# Patient Record
Sex: FEMALE | Race: ASIAN | NOT HISPANIC OR LATINO | URBAN - METROPOLITAN AREA
[De-identification: names, ages, dates, MRNs, and addresses within clinical notes are randomized per-mention and may not be internally consistent; named-entity substitution may affect disease eponyms.]

---

## 2023-04-21 ENCOUNTER — EMERGENCY (EMERGENCY)
Facility: HOSPITAL | Age: 33
LOS: 1 days | Discharge: HOME | End: 2023-04-21
Attending: EMERGENCY MEDICINE
Payer: MEDICAID

## 2023-04-21 VITALS
RESPIRATION RATE: 19 BRPM | SYSTOLIC BLOOD PRESSURE: 115 MMHG | HEART RATE: 184 BPM | WEIGHT: 100.09 LBS | TEMPERATURE: 98 F | DIASTOLIC BLOOD PRESSURE: 75 MMHG | OXYGEN SATURATION: 96 % | HEIGHT: 62 IN

## 2023-04-21 DIAGNOSIS — Z98.891 HISTORY OF UTERINE SCAR FROM PREVIOUS SURGERY: Chronic | ICD-10-CM

## 2023-04-21 LAB
ALBUMIN SERPL ELPH-MCNC: 4.5 G/DL — SIGNIFICANT CHANGE UP (ref 3.3–5)
ALP SERPL-CCNC: 93 U/L — SIGNIFICANT CHANGE UP (ref 40–120)
ALT FLD-CCNC: 38 U/L — SIGNIFICANT CHANGE UP (ref 10–45)
ANION GAP SERPL CALC-SCNC: 15 MMOL/L — SIGNIFICANT CHANGE UP (ref 5–17)
AST SERPL-CCNC: 52 U/L — HIGH (ref 10–40)
BASOPHILS # BLD AUTO: 0.17 K/UL — SIGNIFICANT CHANGE UP (ref 0–0.2)
BASOPHILS NFR BLD AUTO: 1.1 % — SIGNIFICANT CHANGE UP (ref 0–2)
BILIRUB SERPL-MCNC: 0.6 MG/DL — SIGNIFICANT CHANGE UP (ref 0.2–1.2)
BUN SERPL-MCNC: 20 MG/DL — SIGNIFICANT CHANGE UP (ref 7–23)
CALCIUM SERPL-MCNC: 9.2 MG/DL — SIGNIFICANT CHANGE UP (ref 8.4–10.5)
CHLORIDE SERPL-SCNC: 103 MMOL/L — SIGNIFICANT CHANGE UP (ref 96–108)
CO2 SERPL-SCNC: 20 MMOL/L — LOW (ref 22–31)
CREAT SERPL-MCNC: 0.88 MG/DL — SIGNIFICANT CHANGE UP (ref 0.5–1.3)
EGFR: 89 ML/MIN/1.73M2 — SIGNIFICANT CHANGE UP
EOSINOPHIL # BLD AUTO: 0.21 K/UL — SIGNIFICANT CHANGE UP (ref 0–0.5)
EOSINOPHIL NFR BLD AUTO: 1.4 % — SIGNIFICANT CHANGE UP (ref 0–6)
GLUCOSE SERPL-MCNC: 171 MG/DL — HIGH (ref 70–99)
HCG UR QL: NEGATIVE — SIGNIFICANT CHANGE UP
HCT VFR BLD CALC: 41.1 % — SIGNIFICANT CHANGE UP (ref 34.5–45)
HGB BLD-MCNC: 13.1 G/DL — SIGNIFICANT CHANGE UP (ref 11.5–15.5)
IMM GRANULOCYTES NFR BLD AUTO: 0.4 % — SIGNIFICANT CHANGE UP (ref 0–0.9)
LYMPHOCYTES # BLD AUTO: 18.8 % — SIGNIFICANT CHANGE UP (ref 13–44)
LYMPHOCYTES # BLD AUTO: 2.89 K/UL — SIGNIFICANT CHANGE UP (ref 1–3.3)
MAGNESIUM SERPL-MCNC: 2.1 MG/DL — SIGNIFICANT CHANGE UP (ref 1.6–2.6)
MCHC RBC-ENTMCNC: 25 PG — LOW (ref 27–34)
MCHC RBC-ENTMCNC: 31.9 GM/DL — LOW (ref 32–36)
MCV RBC AUTO: 78.6 FL — LOW (ref 80–100)
MONOCYTES # BLD AUTO: 0.71 K/UL — SIGNIFICANT CHANGE UP (ref 0–0.9)
MONOCYTES NFR BLD AUTO: 4.6 % — SIGNIFICANT CHANGE UP (ref 2–14)
NEUTROPHILS # BLD AUTO: 11.31 K/UL — HIGH (ref 1.8–7.4)
NEUTROPHILS NFR BLD AUTO: 73.7 % — SIGNIFICANT CHANGE UP (ref 43–77)
NRBC # BLD: 0 /100 WBCS — SIGNIFICANT CHANGE UP (ref 0–0)
PLATELET # BLD AUTO: 429 K/UL — HIGH (ref 150–400)
POTASSIUM SERPL-MCNC: 3.3 MMOL/L — LOW (ref 3.5–5.3)
POTASSIUM SERPL-SCNC: 3.3 MMOL/L — LOW (ref 3.5–5.3)
PROT SERPL-MCNC: 7.2 G/DL — SIGNIFICANT CHANGE UP (ref 6–8.3)
RBC # BLD: 5.23 M/UL — HIGH (ref 3.8–5.2)
RBC # FLD: 14.3 % — SIGNIFICANT CHANGE UP (ref 10.3–14.5)
SODIUM SERPL-SCNC: 138 MMOL/L — SIGNIFICANT CHANGE UP (ref 135–145)
WBC # BLD: 15.35 K/UL — HIGH (ref 3.8–10.5)
WBC # FLD AUTO: 15.35 K/UL — HIGH (ref 3.8–10.5)

## 2023-04-21 PROCEDURE — 99053 MED SERV 10PM-8AM 24 HR FAC: CPT

## 2023-04-21 PROCEDURE — 99291 CRITICAL CARE FIRST HOUR: CPT

## 2023-04-21 RX ORDER — SODIUM CHLORIDE 9 MG/ML
1000 INJECTION, SOLUTION INTRAVENOUS ONCE
Refills: 0 | Status: COMPLETED | OUTPATIENT
Start: 2023-04-21 | End: 2023-04-21

## 2023-04-21 RX ORDER — POTASSIUM CHLORIDE 20 MEQ
40 PACKET (EA) ORAL ONCE
Refills: 0 | Status: COMPLETED | OUTPATIENT
Start: 2023-04-21 | End: 2023-04-22

## 2023-04-21 RX ADMIN — SODIUM CHLORIDE 4000 MILLILITER(S): 9 INJECTION, SOLUTION INTRAVENOUS at 22:30

## 2023-04-21 RX ADMIN — SODIUM CHLORIDE 2000 MILLILITER(S): 9 INJECTION, SOLUTION INTRAVENOUS at 23:00

## 2023-04-21 NOTE — ED PROVIDER NOTE - CRITICAL CARE ATTENDING CONTRIBUTION TO CARE
------------ATTENDING NOTE------------  pt c/o sudden onset of palpitations, describing feeling hear racing, mild vague chest discomfort, no SOB/dyspnea, no near/syncope, no recent fevers/illness, no recent drugs/intoxicants, in narrow complex tachydysrhythmia 180s, broke to NSR in 90s-100s w/ vagal maneuver, awaiting labs/imaging and close reassessments, CDU for Cards EP consult and TTE and reassessments.  - Kartik Lockett MD   -------------------------------------------------

## 2023-04-21 NOTE — ED CDU PROVIDER DISPOSITION NOTE - CLINICAL COURSE
33-year-old female with no known past medical history, presents to the ED complaining of palpitations.  Patient is an employee and reports she developed palpitations while returning from her break, felt mild lightheadedness and perioral paresthesias.  Patient was directed to the ED, when she presented she was found to be in SVT with a heart rate of 180s.  ED course: Patient responded to vagal maneuvers upon arrival to ED, with conversion to normal sinus rhythm in the 90s/sinus tachycardia in the low 100s.  Upon CDU PA interview, patient reports symptoms resolved and she feels otherwise well.  Patient denies fevers chills, recent illness, URI symptoms, vomiting, diarrhea, caffeine use, thyroid disease, stimulant use, over-the-counter cough/cold medications, family history of cardiovascular disease or dysrhythmia.  Patient states she has had multiple prior episodes of similar palpitations but has never sought medical care for these episodes.  Patient reports some episodes in which she syncopized as after developing palpitations.  Labs in ED significant for WBC 15, K3.3 (given oral repletion), troponin less than 6, magnesium 2.1.  Phosphorus and thyroid studies pending.  CDU course: TTE___. EP____. 33-year-old female with no known past medical history, presents to the ED complaining of palpitations.  Patient is an employee and reports she developed palpitations while returning from her break, felt mild lightheadedness and perioral paresthesias.  Patient was directed to the ED, when she presented she was found to be in SVT with a heart rate of 180s.  ED course: Patient responded to vagal maneuvers upon arrival to ED, with conversion to normal sinus rhythm in the 90s/sinus tachycardia in the low 100s.  Upon CDU PA interview, patient reports symptoms resolved and she feels otherwise well.  Patient denies fevers chills, recent illness, URI symptoms, vomiting, diarrhea, caffeine use, thyroid disease, stimulant use, over-the-counter cough/cold medications, family history of cardiovascular disease or dysrhythmia.  Patient states she has had multiple prior episodes of similar palpitations but has never sought medical care for these episodes.  Patient reports some episodes in which she syncopized as after developing palpitations.  Labs in ED significant for WBC 15, K3.3 (given oral repletion), troponin less than 6, magnesium 2.1.  Phosphorus and thyroid studies pending.  CDU course: pt did well, no events on tele here, echo normal. labs improved this morning. seen by cards and ep- cleared for d/c home, on verapamil, plan for ablation outpatient.

## 2023-04-21 NOTE — ED CDU PROVIDER DISPOSITION NOTE - PATIENT PORTAL LINK FT
You can access the FollowMyHealth Patient Portal offered by [facility] by registering at the following website: [email or web address]. By joining [employer]’s FollowMyHealth portal, you will also be able to view your health information using other applications (apps) compatible with our system.

## 2023-04-21 NOTE — ED CDU PROVIDER DISPOSITION NOTE - ATTENDING APP SHARED VISIT CONTRIBUTION OF CARE
Patient was seen by cardiology and recommended for echo.  Await the results of that.  Patient has been asymptomatic and in sinus rhythm while here.

## 2023-04-21 NOTE — ED CDU PROVIDER INITIAL DAY NOTE - OBJECTIVE STATEMENT
33-year-old female with no known past medical history, presents to the ED complaining of palpitations.  Patient is an employee and reports she developed palpitations while returning from her break, felt mild lightheadedness and perioral paresthesias.  Patient was directed to the ED, when she presented she was found to be in SVT with a heart rate of 180s.  ED course: Patient responded to vagal maneuvers upon arrival to ED, with conversion to normal sinus rhythm in the 90s/sinus tachycardia in the low 100s.  Upon CDU PA interview, patient reports symptoms resolved and she feels otherwise well.  Patient denies fevers chills, recent illness, URI symptoms, vomiting, diarrhea, caffeine use, thyroid disease, stimulant use, over-the-counter cough/cold medications, family history of cardiovascular disease or dysrhythmia. Pt reports rare etoh use. Patient states she has had multiple prior episodes of similar palpitations but has never sought medical care for these episodes.  Patient reports some episodes in which she syncopized as after developing palpitations.  Labs in ED significant for WBC 15, K3.3 (given oral repletion), troponin less than 6, magnesium 2.1.  Phosphorus and thyroid studies pending.

## 2023-04-21 NOTE — ED CDU PROVIDER DISPOSITION NOTE - NSFOLLOWUPINSTRUCTIONS_ED_ALL_ED_FT
1. Follow up with your PCP in 1-2 days. If you do not have a PCP you may follow up with our general internal medicine clinic ([phone]) for continued care.        You may follow-up in the cardiology clinic ([phone]) within 2-3 days.     2. Show copies of your reports given to you.       Take all of your other medications as previously prescribed.     3. Please return to the ED immediately if you develop any worsening or continued chest pain, shortness of breath, palpitations, weakness, nausea/vomiting, lightheadedness, or for any other concerning symptoms. 1. Stay hydrated.  2. Take Verapamil 80mg every 8hrs as needed for palpitations. also can try vagal maneuver if symptoms return.   3. Follow up with your PCP in 1-2 days. Follow up with Cardiologist Dr. Navas within 3-5 days. Follow up with Dr. Almaguer outpatient will set you up with ablation outpatient.    Kendell Navas (DO)  Cardiovascular Disease; Internal Medicine; Nuclear Cardiology  [address], [address]  [place] [address]  Phone: [phone]  Fax: [phone]      Singh Almaguer (MD; PhD)  Cardiac Electrophysiology; Cardiovascular Disease; Internal Medicine  [facility] - Dept of Cardiology, [address] [place] [address]  Phone: [phone]  Fax: [phone]     (Bring printed results to your doctor visit).  4. Return if symptoms, worsen, fever, weakness, chest pain, difficulty breathing, dizziness and all other concerns.    Supraventricular Tachycardia, Adult  Supraventricular tachycardia (SVT) is a type of abnormal heart rhythm. It causes the heart to beat very quickly. SVT can start suddenly and last for a short time, which is called paroxysmal SVT, or it may last longer and require specialized treatment to return the heart rhythm to normal.    A normal resting heart rate is 60–100 beats per minute. During an episode of SVT, your heart rate may be higher than 150 beats per minute. Episodes of SVT can be frightening, but they are usually not dangerous. However, if episodes happen several times a day or last longer than a few seconds, they may lead to heart failure.    What are the causes?    Usually, a normal heartbeat starts when an area called the sinoatrial node releases an electrical signal. In SVT, other areas of the heart send out electrical signals that interfere with the signal from the sinoatrial node. The cause of this abnormal electrical activity is not known.    What increases the risk?  You are more likely to develop this condition if you are:  Middle aged or younger.  Female.  The following factors may also make you more likely to develop this condition:  Stress or anxiety.  Tiredness.  Smoking.  Stimulant drugs, such as cocaine and methamphetamine.  Alcohol.  Caffeine.  Pregnancy.  Having any of these conditions:  A thyroid condition.  Diabetes mellitus.  Obstructive sleep apnea.  What are the signs or symptoms?  Symptoms of this condition include:  A pounding heart.  A feeling that the heart is skipping beats (palpitations).  Weakness.  Shortness of breath.  Tightness or pain in your chest.  Light-headedness or dizziness.  Anxiety.  Sweating.  Nausea.  Fainting.  Fatigue or tiredness.  A mild episode may not cause symptoms.    How is this diagnosed?  This condition may be diagnosed based on:  Your symptoms.  A physical exam. If you have an episode of SVT during the exam, the health care provider may be able to diagnose SVT by listening to your heart and feeling your pulse.  Tests. These may include:  An electrocardiogram (ECG). This test is done to check for problems with electrical activity in the heart.  A Holter monitor or event monitor test. This test involves wearing a portable device that monitors your heart rate over time.  An echocardiogram. This test involves taking an image of your heart using sound waves. It is done to rule out other causes of a fast heart rate.  A stress echocardiogram. This test involves doing an echocardiogram when you are at rest and after exercise.  Blood tests.  An electrophysiology study (EPS). This tests the electrical activity in your heart to find where the abnormal heart rhythm is coming from using cardiac catheters.  How is this treated?  This condition may be treated with:  Vagal nerve stimulation. This involves stimulating your vagus nerve, which is a nerve that runs from the chest, through the neck, to the lower part of the brain. Stimulating this nerve can slow down the heart. It is often the first and only treatment that is needed for this condition. Work with your health care provider to find which technique works best for you. Ways to do this treatment include:  Laying on your back, then holding your breath and pushing, as though you are having a bowel movement.  Massaging an area on one side of your neck, below your jaw. Do not try this yourself. Only a health care provider should do this. If done the wrong way, it can lead to a stroke.  Bending forward with your head between your legs.  Coughing while bending forward with your head between your legs.  Applying an ice-cold, wet towel to your face.  Medicines that prevent attacks.  Medicine to stop an attack. The medicine is given through an IV at the hospital.  A small electric shock (cardioversion) that stops an attack. Before you get the shock, you will get medicine to make you fall asleep.  Radiofrequency ablation. In this procedure, a small, thin tube (catheter) is used to send radiofrequency energy to the area of tissue that is causing the rapid heartbeats. The energy kills the cells and helps your heart keep a normal rhythm. You may have this treatment if you have symptoms of SVT often.  If you do not have symptoms, you may not need treatment.    Follow these instructions at home:  Stress    Avoid stressful situations when possible.  Find healthy ways of managing stress, such as:  Taking part in relaxing activities, such as yoga, meditation, or being out in nature.  Listening to relaxing music.  Practicing relaxation techniques, such as deep breathing.  Leading a healthy lifestyle. This involves getting plenty of sleep, exercising, and eating a balanced diet.  Attending counseling or talk therapy with a mental health professional.  Lifestyle      Try to get at least 7 hours of sleep each night.  Do not use any products that contain nicotine or tobacco. These products include cigarettes, chewing tobacco, and vaping devices, such as e-cigarettes. If you need help quitting, ask your health care provider.  Do not drink alcohol if it triggers episodes of SVT.  If alcohol does not seem to trigger episodes, limit your alcohol intake. If you drink alcohol:  Limit how much you have to:  0–1 drink a day for women who are not pregnant.  0–2 drinks a day for men.  Know how much alcohol is in your drink. In the [place] one drink equals one 12 oz bottle of beer (355 mL), one 5 oz glass of wine (148 mL), or one 1½ oz glass of hard liquor (44 mL).  Be aware of how caffeine affects your condition. If caffeine:  Triggers episodes of SVT, do not eat, drink, or use anything with caffeine in it.  Does not seem to trigger episodes, consume caffeine in moderation.  Do not use stimulant drugs. If you need help quitting, talk with your health care provider.  General instructions    Maintain a healthy weight.  Exercise regularly. Ask your health care provider to suggest some good activities for you. Aim for one or a combination of the followin minutes per week of moderate exercise, such as walking or yoga.  75 minutes per week of vigorous exercise, such as running or swimming.  Perform vagus nerve stimulation as directed by your health care provider.  Take over-the-counter and prescription medicines only as told by your health care provider.  Keep all follow-up visits. This is important.  Contact a health care provider if:  You have episodes of SVT more often than before.  Episodes of SVT last longer than before.  Vagus nerve stimulation is no longer helping.  You have new symptoms.  Get help right away if:  You have chest pain.  Your symptoms get worse.  You have trouble breathing.  You have an episode of SVT that lasts longer than 20 minutes.  You faint.  These symptoms may represent a serious problem that is an emergency. Do not wait to see if the symptoms will go away. Get medical help right away. Call your local emergency services (911 in the [place] Do not drive yourself to the hospital.    Summary  Supraventricular tachycardia (SVT) is a type of abnormal heart rhythm.  During an episode of SVT, your heart rate may be higher than 150 beats per minute.  If you do not have symptoms, you may not need treatment.  This information is not intended to replace advice given to you by your health care provider. Make sure you discuss any questions you have with your health care provider.    Document Revised: 2021 Document Reviewed: 2021

## 2023-04-21 NOTE — ED ADULT NURSE NOTE - OBJECTIVE STATEMENT
Patient is a 33 year old female complaining of palpitations. A&Ox4. Ambulates independently. Patient reports at 2200 palpitations started with SOB - tried to rest but they did not go away. On arrival patient in SVT - vagal maneuvers preformed by MD Lockett, KIESHA broke. Patient now sinus tach, breathing comfortably on room air. Patient denies headache, dizziness, chest pain, cough, abdominal pain, n/v/d, urinary symptoms, fevers, chills, weakness at this time.

## 2023-04-21 NOTE — ED CDU PROVIDER INITIAL DAY NOTE - NS ED ATTENDING STATEMENT MOD
This was a shared visit with the ANYA. I reviewed and verified the documentation and independently performed the documented:

## 2023-04-21 NOTE — ED CDU PROVIDER INITIAL DAY NOTE - PROGRESS NOTE DETAILS
Only recording of SVT in ECG performed at pt arrival, also with 12-lead ECG after conversion to NSR, both placed in pt charts, I spoke to WAR room and no strips available of SVT. d/w EP (cards fellow) agree with plan thusfar and will look into getting patient a more long-term cardiac monitor, will follow while in CDU. -Zack Villavicencio PA-C

## 2023-04-21 NOTE — ED CDU PROVIDER INITIAL DAY NOTE - PHYSICAL EXAMINATION
GEN: Pt in NAD, answer questions appropriate, non-toxic.  PSYCH: Affect appropriate. Normal mentation.  EYES: Sclera white w/o injection.   ENT: Head NCAT. Neck supple FROM.  RESP: CTA b/l, no wheezes, rales, or rhonchi.   CARDIAC: Mildly tachycardic, regular, clear distinct S1, S2, no appreciable murmurs.  ABD: Abdomen soft, non-tender.   VASC: No edema or calf tenderness. Distal extremities warm, cap refill <2 seconds.  SKIN: No notable rash.

## 2023-04-21 NOTE — ED PROVIDER NOTE - PHYSICAL EXAMINATION
anxiousl Appearing;  Symm Facies, PERRL 3mm, (-)Pallor, Anicteric, Dry MM;  No JVD/Bruits or stridor;  tachycardic, regular w/o m/g/r, equal distal pulses;   CTAB w/o distress;   Abd soft, nt/nd, +bs;  No CVAT;  No edema/calf tender;  No rash;  AOX3, Normal speech, normal strength/sensation

## 2023-04-22 VITALS
TEMPERATURE: 98 F | SYSTOLIC BLOOD PRESSURE: 113 MMHG | HEART RATE: 70 BPM | OXYGEN SATURATION: 98 % | DIASTOLIC BLOOD PRESSURE: 68 MMHG

## 2023-04-22 LAB
ANION GAP SERPL CALC-SCNC: 10 MMOL/L — SIGNIFICANT CHANGE UP (ref 5–17)
BASE EXCESS BLDV CALC-SCNC: -1.5 MMOL/L — SIGNIFICANT CHANGE UP (ref -2–3)
BUN SERPL-MCNC: 15 MG/DL — SIGNIFICANT CHANGE UP (ref 7–23)
CA-I SERPL-SCNC: 1.22 MMOL/L — SIGNIFICANT CHANGE UP (ref 1.15–1.33)
CALCIUM SERPL-MCNC: 8.5 MG/DL — SIGNIFICANT CHANGE UP (ref 8.4–10.5)
CHLORIDE BLDV-SCNC: 107 MMOL/L — SIGNIFICANT CHANGE UP (ref 96–108)
CHLORIDE SERPL-SCNC: 108 MMOL/L — SIGNIFICANT CHANGE UP (ref 96–108)
CO2 BLDV-SCNC: 25 MMOL/L — SIGNIFICANT CHANGE UP (ref 22–26)
CO2 SERPL-SCNC: 22 MMOL/L — SIGNIFICANT CHANGE UP (ref 22–31)
CREAT SERPL-MCNC: 0.63 MG/DL — SIGNIFICANT CHANGE UP (ref 0.5–1.3)
EGFR: 120 ML/MIN/1.73M2 — SIGNIFICANT CHANGE UP
GAS PNL BLDV: 138 MMOL/L — SIGNIFICANT CHANGE UP (ref 136–145)
GAS PNL BLDV: SIGNIFICANT CHANGE UP
GLUCOSE BLDV-MCNC: 90 MG/DL — SIGNIFICANT CHANGE UP (ref 70–99)
GLUCOSE SERPL-MCNC: 92 MG/DL — SIGNIFICANT CHANGE UP (ref 70–99)
HCO3 BLDV-SCNC: 24 MMOL/L — SIGNIFICANT CHANGE UP (ref 22–29)
HCT VFR BLDA CALC: 36 % — SIGNIFICANT CHANGE UP (ref 34.5–46.5)
HGB BLD CALC-MCNC: 11.9 G/DL — SIGNIFICANT CHANGE UP (ref 11.7–16.1)
LACTATE BLDV-MCNC: 0.8 MMOL/L — SIGNIFICANT CHANGE UP (ref 0.5–2)
MAGNESIUM SERPL-MCNC: 2.2 MG/DL — SIGNIFICANT CHANGE UP (ref 1.6–2.6)
PCO2 BLDV: 41 MMHG — SIGNIFICANT CHANGE UP (ref 39–42)
PH BLDV: 7.37 — SIGNIFICANT CHANGE UP (ref 7.32–7.43)
PHOSPHATE SERPL-MCNC: 4 MG/DL — SIGNIFICANT CHANGE UP (ref 2.5–4.5)
PO2 BLDV: 80 MMHG — HIGH (ref 25–45)
POTASSIUM BLDV-SCNC: 4 MMOL/L — SIGNIFICANT CHANGE UP (ref 3.5–5.1)
POTASSIUM SERPL-MCNC: 3.9 MMOL/L — SIGNIFICANT CHANGE UP (ref 3.5–5.3)
POTASSIUM SERPL-SCNC: 3.9 MMOL/L — SIGNIFICANT CHANGE UP (ref 3.5–5.3)
SAO2 % BLDV: 97.2 % — HIGH (ref 67–88)
SODIUM SERPL-SCNC: 140 MMOL/L — SIGNIFICANT CHANGE UP (ref 135–145)
T3 SERPL-MCNC: 91 NG/DL — SIGNIFICANT CHANGE UP (ref 80–200)
T4 AB SER-ACNC: 7.2 UG/DL — SIGNIFICANT CHANGE UP (ref 4.6–12)
TSH SERPL-MCNC: 0.5 UIU/ML — SIGNIFICANT CHANGE UP (ref 0.27–4.2)

## 2023-04-22 PROCEDURE — 93005 ELECTROCARDIOGRAM TRACING: CPT

## 2023-04-22 PROCEDURE — 80053 COMPREHEN METABOLIC PANEL: CPT

## 2023-04-22 PROCEDURE — 85018 HEMOGLOBIN: CPT

## 2023-04-22 PROCEDURE — 84443 ASSAY THYROID STIM HORMONE: CPT

## 2023-04-22 PROCEDURE — 82803 BLOOD GASES ANY COMBINATION: CPT

## 2023-04-22 PROCEDURE — G0378: CPT

## 2023-04-22 PROCEDURE — 82435 ASSAY OF BLOOD CHLORIDE: CPT

## 2023-04-22 PROCEDURE — 80048 BASIC METABOLIC PNL TOTAL CA: CPT

## 2023-04-22 PROCEDURE — 85014 HEMATOCRIT: CPT

## 2023-04-22 PROCEDURE — 84295 ASSAY OF SERUM SODIUM: CPT

## 2023-04-22 PROCEDURE — 84484 ASSAY OF TROPONIN QUANT: CPT

## 2023-04-22 PROCEDURE — 93306 TTE W/DOPPLER COMPLETE: CPT

## 2023-04-22 PROCEDURE — 99283 EMERGENCY DEPT VISIT LOW MDM: CPT | Mod: 25

## 2023-04-22 PROCEDURE — 84132 ASSAY OF SERUM POTASSIUM: CPT

## 2023-04-22 PROCEDURE — 76376 3D RENDER W/INTRP POSTPROCES: CPT | Mod: 26

## 2023-04-22 PROCEDURE — 83605 ASSAY OF LACTIC ACID: CPT

## 2023-04-22 PROCEDURE — 99245 OFF/OP CONSLTJ NEW/EST HI 55: CPT

## 2023-04-22 PROCEDURE — 83735 ASSAY OF MAGNESIUM: CPT

## 2023-04-22 PROCEDURE — 82330 ASSAY OF CALCIUM: CPT

## 2023-04-22 PROCEDURE — 99205 OFFICE O/P NEW HI 60 MIN: CPT

## 2023-04-22 PROCEDURE — 93306 TTE W/DOPPLER COMPLETE: CPT | Mod: 26

## 2023-04-22 PROCEDURE — 93356 MYOCRD STRAIN IMG SPCKL TRCK: CPT

## 2023-04-22 PROCEDURE — 85025 COMPLETE CBC W/AUTO DIFF WBC: CPT

## 2023-04-22 PROCEDURE — 81025 URINE PREGNANCY TEST: CPT

## 2023-04-22 PROCEDURE — 99284 EMERGENCY DEPT VISIT MOD MDM: CPT

## 2023-04-22 PROCEDURE — 36415 COLL VENOUS BLD VENIPUNCTURE: CPT

## 2023-04-22 PROCEDURE — 76376 3D RENDER W/INTRP POSTPROCES: CPT

## 2023-04-22 PROCEDURE — 84436 ASSAY OF TOTAL THYROXINE: CPT

## 2023-04-22 PROCEDURE — 84100 ASSAY OF PHOSPHORUS: CPT

## 2023-04-22 PROCEDURE — 84480 ASSAY TRIIODOTHYRONINE (T3): CPT

## 2023-04-22 PROCEDURE — 82947 ASSAY GLUCOSE BLOOD QUANT: CPT

## 2023-04-22 PROCEDURE — 99238 HOSP IP/OBS DSCHRG MGMT 30/<: CPT

## 2023-04-22 RX ORDER — VERAPAMIL HCL 240 MG
1 CAPSULE, EXTENDED RELEASE PELLETS 24 HR ORAL
Qty: 21 | Refills: 0
Start: 2023-04-22 | End: 2023-04-28

## 2023-04-22 RX ADMIN — Medication 40 MILLIEQUIVALENT(S): at 01:40

## 2023-04-22 NOTE — CONSULT NOTE ADULT - SUBJECTIVE AND OBJECTIVE BOX
DATE OF SERVICE: 23 @ 13:21    CHIEF COMPLAINT:Patient is a 33y old  Female who presents with a chief complaint of chest palpitations.     HISTORY OF PRESENT ILLNESS:HPI: Patient is a 33y old female presenting w/ complaint of chest palpitations. Denies PMH.   Presented to the ED while experiencing palpitations that lasted for approximately 5 minutes associated with mild chest discomfort.  On arrival to the ED found to be in narrow complex tachydysrhythmia 180s, broke to NSR in 90s-100s w/ vagal maneuver.       PAST MEDICAL & SURGICAL HISTORY:  No pertinent past medical history      S/P               MEDICATIONS:                  FAMILY HISTORY:  No pertinent family history in first degree relatives        Non-contributory    SOCIAL HISTORY:    [X] Tobacco  [X] Drugs  [ ] Alcohol- social consumption, average 1 drink a week    Allergies    azithromycin (Hives; Rash)    Intolerances    	    REVIEW OF SYSTEMS:  CONSTITUTIONAL: No fever  EYES: No eye pain, visual disturbances, or discharge  ENMT:  No difficulty hearing, tinnitus  NECK: No pain or stiffness  RESPIRATORY: No cough, wheezing,  CARDIOVASCULAR: No chest pain, palpitations, passing out, dizziness, or leg swelling  GASTROINTESTINAL:  No nausea, vomiting, diarrhea or constipation. No melena.  GENITOURINARY: No dysuria, hematuria  NEUROLOGICAL: No stroke like symptoms  SKIN: No burning or lesions   ENDOCRINE: No heat or cold intolerance  MUSCULOSKELETAL: No joint pain or swelling  PSYCHIATRIC: No  anxiety, mood swings  HEME/LYMPH: No bleeding gums  ALLERGY AND IMMUNOLOGIC: No hives or eczema	    All other ROS negative    PHYSICAL EXAM:  T(C): 36.7 (23 @ 11:52), Max: 36.8 (23 @ 22:17)  HR: 88 (23 @ 11:52) (71 - 184)  BP: 125/93 (23 @ 11:52) (100/66 - 128/86)  RR: 18 (23 @ 11:52) (17 - 19)  SpO2: 100% (23 @ 11:52) (96% - 100%)  Wt(kg): --  I&O's Summary      Appearance: Normal	  HEENT:   Normal oral mucosa, EOMI	  Cardiovascular:  S1 S2, No JVD,    Respiratory: Lungs clear to auscultation	  Psychiatry: Alert  Gastrointestinal:  Soft, Non-tender, + BS	  Skin: No rashes   Neurologic: Non-focal  Extremities:  No edema  Vascular: Peripheral pulses palpable    	    	  	  CARDIAC MARKERS:  Labs personally reviewed by me                                  13.1   15.35 )-----------( 429      ( 2023 22:32 )             41.1         140  |  108  |  15  ----------------------------<  92  3.9   |  22  |  0.63    Ca    8.5      2023 05:59  Phos  4.0       Mg     2.2         TPro  7.2  /  Alb  4.5  /  TBili  0.6  /  DBili  x   /  AST  52<H>  /  ALT  38  /  AlkPhos  93            EKG: Personally reviewed by me - NSR, T wave abnormality noted in V1 and V2      Assessment /Plan:   Patient is a 33y old female presenting w/ complaint of chest palpitations. Denies PMH.   Presented to the ED while experiencing palpitations that lasted for approximately 5 minutes associated with mild chest discomfort.  On arrival to the ED found to be in narrow complex tachydysrhythmia 180s, broke to NSR in 90s-100s w/ vagal maneuver. Patient reports similar episode occurred 2 weeks ago while at work, self resolved without interventions.     1. Problem/Plan  Problem: Palpitations   - Given recurrent nature of palpitations, recommend starting Metoprolol 25mg daily  - Trop negative X1, continue trend  - Recommend obtaining TTE  - Can pursue outpatient ablation evaluation   - Monitor tele         Differential diagnosis and plan of care discussed with patient after the evaluation. Counseling on diet, nutritional counseling, weight management, exercise and medication compliance was done.   Advanced care planning/advanced directives discussed with patient/family. DNR status including forceful chest compressions to attempt to restart the heart, ventilator support/artificial breathing, electric shock, artificial nutrition, health care proxy, Molst form all discussed with pt. Pt wishes to consider. More than fifteen minutes spent on discussing advanced directives.     Mara George, DUNG Navas, DO [facility]  Cardiovascular Medicine  [address] [place] [address], [address]  Available for call or text via [employer]  Office [phone]  
33 year old woman with no significant PMHx aside from intermittent sudden onset palpitations who was at work yesterday with recurrent symptoms. 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 She felt weak but no presyncopal. Shw as taken to ED and with Valsalva, the rhythm converted to sinus. No WPW on resting ECG. SVT ECG suggests P waves in terminal QRS c/w AVNRT. Otherwise without chest pain, syncope, dyspnea at rest or exertion. ECHO done today which I personally reviewed was normal.     MEDICATIONS  (STANDING): None    MEDICATIONS  (PRN): None    Allergies  azithromycin (Hives; Rash)    PAST MEDICAL & SURGICAL HISTORY:  No pertinent past medical history    S/P     Social History:  No tobacco, excessive caffeine or alcohol    ROS: 10 point ROS was normal aside from HPI    T(C): 36.7 (23 @ 11:52), Max: 36.8 (23 @ 22:17)  HR: 88 (23 @ 11:52) (71 - 184)  BP: 125/93 (23 @ 11:52) (100/66 - 128/86)  RR: 18 (23 @ 11:52) (17 - 19)  SpO2: 100% (23 @ 11:52) (96% - 100%)    GENERAL: patient appears well, no acute distress, appropriate, pleasant  EYES: sclera clear, no exudates  ENMT: oropharynx clear without erythema, no exudates, moist mucous membranes  NECK: supple, soft, no thyromegaly noted, brisk carotids  LUNGS: good air entry bilaterally, clear to auscultation, symmetric breath sounds, no wheezing or rhonchi appreciated  HEART: soft S1/S2, regular rate and rhythm, no murmurs noted, no lower extremity edema  INTEGUMENT: good skin turgor, no lesions noted  MUSCULOSKELETAL: no clubbing or cyanosis, no obvious deformity  NEUROLOGIC: awake, alert, oriented x3, good muscle tone in 4 extremities, no obvious sensory deficits  PSYCHIATRIC: mood is good, affect is congruent, linear and logical thought process      LABS:                          13.1   15.35 )-----------( 429      ( 2023 22:32 )             41.1         140  |  108  |  15  ----------------------------<  92  3.9   |  22  |  0.63    Ca    8.5      2023 05:59  Phos  4.0       Mg     2.2         TPro  7.2  /  Alb  4.5  /  TBili  0.6  /  DBili  x   /  AST  52<H>  /  ALT  38  /  AlkPhos  93      HCG negative

## 2023-04-22 NOTE — ED CDU PROVIDER SUBSEQUENT DAY NOTE - PHYSICAL EXAMINATION
GEN: Pt in NAD  PSYCH: Affect appropriate.  EYES: Sclera white w/o injection.   ENT: Head NCAT. Neck supple FROM.  RESP: CTA b/l, no wheezes, rales, or rhonchi.   CARDIAC: RRR, clear distinct S1, S2, no appreciable murmurs.  ABD: Abdomen soft, non-tender.   VASC: No edema or calf tenderness.  SKIN: No notable rash.

## 2023-04-22 NOTE — ED ADULT NURSE REASSESSMENT NOTE - NS ED NURSE REASSESS COMMENT FT1
16.30 Pt is evaluated by CDU MD Conor Mcmillan pt is feeling better.  Pt is discharged . Ml out   NILAM Silva explained the follow up care & gave the discharge summary . Pt has stable vitals steady gait A&OX 4 at the time of Discharge
pt arrived to red # 45 from green side via wc after receiving report from Sonia WEST RN; pt is alert and oriented x 4; skin warm and dry, no acute distress; remote tele box removed and placed on bedside cardiac monitor for continuous cardiac monitoring; pt denies sob or chest pain; pt updated re: plan of care, location of bathroom and use of call bell; pt advised to notify staff if feeling any cp or sob.
resting in bed; no change in status; remains on cardiac monitor in NSR with no dysrhythmias noted; no pain
07.00 Am Received the Pt from  ZHOU Shankar . Pt is Observed for Palpitations for ECHO . Received the Pt A&OX 4 obeys commands Princess N/V/D fever chills cp SOB   Comfort care & safety measures continued  IV site looks clean & dry no signs of infiltration noted pt denies  pain IV site .  Pt is advised to call for help  call bell with in the reach pt verbalized the understanding .  pending CDU  MD conway . GCS 15/15 A&OX 4 PERRLA  size 3 Strong upper & lower extremities steady gait   No facial droop  No Hand Leg drop denies numbness tingling Continue to monitor

## 2023-04-22 NOTE — ED CDU PROVIDER SUBSEQUENT DAY NOTE - PROGRESS NOTE DETAILS
CDU NOTE NILAM Silva: pt resting comfortably, feels well without complaint. NAD VSS. no events on tele.  pt seen by Dr. Navas Cardiologist's NP Mara - continue plan for echo, also can give metoprolol 25mg po PRN for palpitations. will f/up echo and then get back in touch with them. CDU NOTE NILAM Silva: pt resting comfortably, feels well without complaint. NAD recent VSS. no events on tele.  pt seen by Dr. Almaguer EP - per our conversation, diagnosis is AV Joel Reentry, ok with pt going home with verapamil 80mg q 8hrs PRN palpitations and plan for ablation outpatient.   spoke with BRIELLE Terry - ok with plan, can do verapamil instead of metoprolol PRN and f/up outpatient  as per DR. CESAR Mcmillan, pt stable for d/c home

## 2023-04-22 NOTE — CONSULT NOTE ADULT - ASSESSMENT
1. PSVT c/w AVNRT  2. Otherwise healthy  3. Previous C section  Normal echocardiogram personally reviewed). Short term I taught her right CSM and Valsalva. Would give her a script for PRN verapamil 80 mg. We discussed options including ablation. At her age, it does not make sense to take chronic medications. She was symptomatic enough to come to the ED. Ablation would be best option and she would like to be scheduled. She may be discharged and I will arrange for ablation, She may return to work.  Discussed with CDU and Dr. Navas. Total time 75 minutes

## 2023-04-22 NOTE — ED CDU PROVIDER SUBSEQUENT DAY NOTE - HISTORY
Pt in NAD resting comfortably. VS stable from last reading. Cardiac monitor- currently NSR 90s. Will continue to monitor. -Zack Villavicencio PA-C

## 2023-04-27 PROBLEM — Z00.00 ENCOUNTER FOR PREVENTIVE HEALTH EXAMINATION: Status: ACTIVE | Noted: 2023-04-27

## 2023-05-01 ENCOUNTER — OUTPATIENT (OUTPATIENT)
Dept: OUTPATIENT SERVICES | Facility: HOSPITAL | Age: 33
LOS: 1 days | End: 2023-05-01
Payer: COMMERCIAL

## 2023-05-01 VITALS
DIASTOLIC BLOOD PRESSURE: 73 MMHG | RESPIRATION RATE: 15 BRPM | HEIGHT: 62 IN | WEIGHT: 126.1 LBS | OXYGEN SATURATION: 98 % | SYSTOLIC BLOOD PRESSURE: 113 MMHG | TEMPERATURE: 98 F | HEART RATE: 95 BPM

## 2023-05-01 DIAGNOSIS — Z98.891 HISTORY OF UTERINE SCAR FROM PREVIOUS SURGERY: Chronic | ICD-10-CM

## 2023-05-01 DIAGNOSIS — Z01.818 ENCOUNTER FOR OTHER PREPROCEDURAL EXAMINATION: ICD-10-CM

## 2023-05-01 DIAGNOSIS — I47.1 SUPRAVENTRICULAR TACHYCARDIA: ICD-10-CM

## 2023-05-01 PROCEDURE — G0463: CPT

## 2023-05-01 PROCEDURE — 86901 BLOOD TYPING SEROLOGIC RH(D): CPT

## 2023-05-01 PROCEDURE — 85027 COMPLETE CBC AUTOMATED: CPT

## 2023-05-01 PROCEDURE — 80048 BASIC METABOLIC PNL TOTAL CA: CPT

## 2023-05-01 PROCEDURE — 86900 BLOOD TYPING SEROLOGIC ABO: CPT

## 2023-05-01 PROCEDURE — 86850 RBC ANTIBODY SCREEN: CPT

## 2023-05-01 NOTE — H&P PST ADULT - ASSESSMENT
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

## 2023-05-01 NOTE — H&P PST ADULT - NS HP PST LATEX ALLERGY
Is This A New Presentation, Or A Follow-Up?: New Squamous Cell Carcinoma When Was Squamous Cell Carcinoma Biopsied? (Optional): Sept 1, 2020 No

## 2023-05-01 NOTE — H&P PST ADULT - HISTORY OF PRESENT ILLNESS
+COVID 2/2022 mild s/s home quarantine 33 year old female experienced palpitations  and fast heart rate went to ER work up dx with SVT. Admitted given Verapamil and discharged  to follow up with EPS for ablation. Has Verapamil for prn use    +COVID 2/2022 mild s/s home quarantine

## 2023-05-03 DIAGNOSIS — I47.1 SUPRAVENTRICULAR TACHYCARDIA: ICD-10-CM

## 2023-05-03 RX ORDER — VERAPAMIL HYDROCHLORIDE 80 MG/1
80 TABLET ORAL DAILY
Refills: 0 | Status: ACTIVE | COMMUNITY
Start: 2023-05-03

## 2023-05-29 ENCOUNTER — TRANSCRIPTION ENCOUNTER (OUTPATIENT)
Age: 33
End: 2023-05-29

## 2023-06-29 ENCOUNTER — APPOINTMENT (OUTPATIENT)
Dept: ELECTROPHYSIOLOGY | Facility: CLINIC | Age: 33
End: 2023-06-29

## 2025-01-14 ENCOUNTER — NON-APPOINTMENT (OUTPATIENT)
Age: 35
End: 2025-01-14

## 2025-01-23 NOTE — ED ADULT NURSE NOTE - IS THE PATIENT ABLE TO BE SCREENED?
Initiate Treatment: fluticasone propionate 0.05 % topical cream \\nQuantity: 30.0 g  Days Supply: 30\\nSig: Apply to the affected areas twice daily
Detail Level: Simple
Render In Strict Bullet Format?: No
Yes